# Patient Record
Sex: MALE | Race: ASIAN
[De-identification: names, ages, dates, MRNs, and addresses within clinical notes are randomized per-mention and may not be internally consistent; named-entity substitution may affect disease eponyms.]

---

## 2021-03-18 NOTE — PDOC4
Operative Note


Operative Note


Date: 318 of 2021 at 1235


Preoperative diagnosis: Recurrent ventral incisional hernia


Postoperative diagnosis: Same


Procedure: Open ventral hernia repair with mesh, removal of old mesh, component 

separation


Surgeon: Getachew Johnson assist: Pedro


Dictation: Patient is a 40-year-old male who had undergone bariatric surgery 

with complications with a repeat laparotomy subsequently developed incisional 

hernia this was repaired with mesh returns now with a recurrence of his hernia. 

Procedure of open ventral hernia repair with mesh was explained to the patient 

as well as component separation all risk benefits were also discussed occluding 

bleeding infection alternatives to this procedure also discussed with the 

patient who seemed to understand and gave both verbal and written consent to 

have the procedure performed.  Patient was taken to the operating room placed in

the supine position general anesthesia was initiated once patient was sleeping 

in bed his abdomen was prepped and draped usual sterile fashion using 

ChloraPrep.  Ioban was then placed over the abdomen a midline incision was made 

excising his previous scar from the xiphoid to just below the umbilicus.  This 

was carried down through the subcutaneous tissues electrocautery right 

hemostasis hernia sac was dissected free of its attachments with electrocautery 

and then opened to allow access to the abdomen there were few adhesions within 

the abdomen there is a band of omentum stuck down to the pelvis this was freed 

up sharply and excised and sent for pathology.  Incision was made in the 

posterior fascia freeing this up from the attachments to the muscle out 

laterally circumferentially around the fascial defect it was noted on the right 

side of the fascial defect there was a piece of mesh somewhat wadded up on the 

right upper quadrant this was sharply excised with electrocautery and removed.  

Once posterior fascia was freed up the relaxing incisions were made in the 

oblique muscles just medial to the perforating vessels this allowed for much 

mobilization of the posterior fascia.  Posterior fascia was then closed with a 

running looped PDS in the midline.  Bard Ventio mesh 22 x 27 cm was placed over 

the posterior fascia left for some uncovered area in the epigastric so a ventral

light ST mesh was placed in the epigastric area in the same plane as the 

Ventrio.  The mesh was sutured to the anterior fascia with 0 Prolene's 6 sutures

were used.  Secure strap tacker was then used to tack the mesh to the anterior 

fascia circumferentially.  The epigastric mesh was sewn to theVentrio mesh with 

0 Prolene.  The anterior fascia were then closed over the mesh with a running 0 

PDS the deep subcutaneous layer was closed with running 3-0 Vicryl and the skin 

was reapproximated for subcuticular Monocryl Mastisol Steri-Strips and 4 x 4's 

Medipore tape were applied as dressing.  Patient was awakened and extubated 

operating room taken to recovery in stable condition all sponge instrument 

needle counts listed as correct estimated blood loss 150 mL











NIKKI IYER MD             Mar 18, 2021 12:41

## 2021-03-19 NOTE — PDOC
SURGICAL PROGRESS NOTE


DATE: 3/19/21 


TIME: 10:11


Subjective


tolerating clears


urinating


has not walked yet


Vital Signs





Vital Signs








  Date Time  Temp Pulse Resp B/P (MAP) Pulse Ox O2 Delivery O2 Flow Rate FiO2


 


3/19/21 07:00 97.7 101 18 132/78 (96) 94 Room Air  





 97.7       


 


3/18/21 22:08       2.0 








I&O











Intake and Output 


 


 3/19/21





 07:00


 


Intake Total 3950 ml


 


Output Total 1150 ml


 


Balance 2800 ml


 


 


 


Intake Oral 400 ml


 


IV Total 3550 ml


 


Output Urine Total 1000 ml


 


Estimated Blood Loss 150 ml


 


# Voids 2








General:  Alert, Oriented X3, Cooperative


Abdomen:  Soft, Other (ND, binder in place)


Assessment/Plan


s/p hernia repair


advance diet


ambulate


likely home tomorrow





Justicifation of Admission Dx:


Justifications for Admission:


Justification of Admission Dx:  Yes


Comments:


hernia











HARRISON LONGORIA            Mar 19, 2021 10:12

## 2021-03-19 NOTE — NUR
SW following. Discussed with RN, pt from home alone, room air, clear liquid diet, COVID-19 
negative. Per RN pt gets around fine. RN advised no SW needs at this time. Anticipate 
discharge home with self care in the next day or so. SW will continue to follow.

## 2021-03-19 NOTE — DISCH
DISCHARGE INSTRUCTIONS


Condition on Discharge


Condition on Discharge:  Stable





Activity After Discharge


Activity Instructions for Disc:  Activity as tolerated


Bathing Instructions:  Shower-keep dressing dry, No Tub Bath until see 


Lifting Instructions after Dis:  No heavy lifting (15 lba), No pulling or 

pushing


Driving Instructions after Dis:  Do not drive





Diet after Discharge


Diet after Discharge:  Regular





Wound Incision Care


Wound/Incision Care:  Change dressing, May get incision wet


Other wound/incision instructi:  chino abdominal binder





Contacting the  after DC


Call your doctor for:  Concerns you may have





Follow-Up


Follow up with:  Dr Chilel 2 weeks call to schedule [phone]











HARRISON LONGORIA            Mar 19, 2021 10:16

## 2021-03-20 VITALS — SYSTOLIC BLOOD PRESSURE: 154 MMHG | DIASTOLIC BLOOD PRESSURE: 94 MMHG

## 2021-03-20 NOTE — PDOC
PROGRESS NOTES


Date of Service


DATE: 3/20/21 


TIME: 11:59





Subjective


Subjective


doing well





Objective


Objective





Vital Signs








  Date Time  Temp Pulse Resp B/P (MAP) Pulse Ox O2 Delivery O2 Flow Rate FiO2


 


3/20/21 09:19    155/97    


 


3/20/21 07:00 97.9 98 18  94 Room Air  





 97.9       


 


3/18/21 22:08       2.0 














Intake and Output 


 


 3/20/21





 07:00


 


Intake Total 970 ml


 


Output Total 1350 ml


 


Balance -380 ml


 


 


 


Intake Oral 970 ml


 


Output Urine Total 1350 ml


 


# Voids 3











Assessment


Assessment


S/P VHR





Plan


Plan of Care


Discharge





Comment


Review of Relevant


I have reviewed the following items jose (where applicable) has been applied.


Medications





Current Medications


Fentanyl Citrate (Fentanyl 2ml Vial) 25 mcg PRN Q5MIN  PRN IVP MILD PAIN 1-3;  

Start 3/18/21 at 06:00;  Stop 3/18/21 at 17:04;  Status DC


Fentanyl Citrate (Fentanyl 2ml Vial) 50 mcg PRN Q5MIN  PRN IVP MODERATE PAIN 4-6

Last administered on 3/18/21at 13:33;  Start 3/18/21 at 06:00;  Stop 3/18/21 at 

17:04;  Status DC


Morphine Sulfate (Morphine Sulfate) 1 mg PRN Q10MIN  PRN IVP SEVERE PAIN 7-10;  

Start 3/18/21 at 06:00;  Stop 3/18/21 at 17:04;  Status DC


Ringer's Solution 1,000 ml @  30 mls/hr Q24H IV  Last administered on 3/18/21at 

08:19;  Start 3/18/21 at 06:00;  Stop 3/18/21 at 17:59;  Status DC


Hydromorphone HCl (Dilaudid) 0.5 mg PRN Q10MIN  PRN IVP SEVERE PAIN 7-10, 2nd 

CHOICE Last administered on 3/18/21at 14:28;  Start 3/18/21 at 06:00;  Stop 

3/18/21 at 17:04;  Status DC


Prochlorperazine Edisylate (Compazine) 5 mg PACU PRN  PRN IVP NAUSEA, MRX1 Last 

administered on 3/18/21at 13:53;  Start 3/18/21 at 06:00;  Stop 3/18/21 at 17:04

;  Status DC


Acetaminophen (Tylenol) 1,000 mg 1X PREOP  PRN PO PRIOR TO PROCEDURE Last 

administered on 3/18/21at 08:19;  Start 3/18/21 at 06:00


Cefazolin Sodium/ Dextrose 50 ml @  100 mls/hr 1X PREOP  PRN IV PRIOR TO 

PROCEDURE Last administered on 3/18/21at 09:58;  Start 3/18/21 at 06:00;  Stop 

3/18/21 at 18:00;  Status DC


Bupivacaine HCl/ Epinephrine Bitart (Sensorcain-Epi 0.25% Kit) 30 ml STK-MED 

ONCE .ROUTE  Last administered on 3/18/21at 10:18;  Start 3/18/21 at 07:19;  

Stop 3/18/21 at 07:19;  Status DC


Propofol (Diprivan) 200 mg STK-MED ONCE IV ;  Start 3/18/21 at 09:39;  Stop 

3/18/21 at 09:39;  Status DC


Lidocaine HCl (Lidocaine Pf 2% Vial) 5 ml STK-MED ONCE .ROUTE ;  Start 3/18/21 

at 09:39;  Stop 3/18/21 at 09:39;  Status DC


Ondansetron HCl (Zofran) 4 mg STK-MED ONCE .ROUTE ;  Start 3/18/21 at 09:39;  

Stop 3/18/21 at 09:39;  Status DC


Dexamethasone Sodium Phosphate (Decadron) 4 mg STK-MED ONCE .ROUTE ;  Start 

3/18/21 at 09:39;  Stop 3/18/21 at 09:39;  Status DC


Succinylcholine Chloride (Anectine) 200 mg STK-MED ONCE .ROUTE ;  Start 3/18/21 

at 09:39;  Stop 3/18/21 at 09:40;  Status DC


Rocuronium Bromide (Zemuron) 50 mg STK-MED ONCE .ROUTE ;  Start 3/18/21 at 

09:39;  Stop 3/18/21 at 09:40;  Status DC


Fentanyl Citrate (Fentanyl 2ml Vial) 100 mcg STK-MED ONCE .ROUTE ;  Start 

3/18/21 at 09:39;  Stop 3/18/21 at 09:40;  Status DC


Midazolam HCl (Versed) 2 mg STK-MED ONCE .ROUTE ;  Start 3/18/21 at 09:40;  Stop

3/18/21 at 09:40;  Status DC


Famotidine (Pepcid Vial) 20 mg STK-MED ONCE .ROUTE ;  Start 3/18/21 at 09:40;  

Stop 3/18/21 at 09:40;  Status DC


Glycopyrrolate (Robinul) 1 mg STK-MED ONCE .ROUTE ;  Start 3/18/21 at 10:33;  

Stop 3/18/21 at 10:33;  Status DC


Rocuronium Bromide (Zemuron) 50 mg STK-MED ONCE .ROUTE ;  Start 3/18/21 at 

10:58;  Stop 3/18/21 at 10:59;  Status DC


Phenylephrine HCl (PHENYLEPHRINE in 0.9% NACL PF) 1 mg STK-MED ONCE IV ;  Start 

3/18/21 at 11:22;  Stop 3/18/21 at 11:22;  Status DC


Ephedrine Sulfate (ePHEDrine PF IN SALINE SYRINGE) 50 mg STK-MED ONCE IV ;  

Start 3/18/21 at 11:22;  Stop 3/18/21 at 11:22;  Status DC


Neostigmine Bromide (Neostigmine Methylsulfate) 5 mg STK-MED ONCE .ROUTE ;  

Start 3/18/21 at 11:31;  Stop 3/18/21 at 11:31;  Status DC


Rocuronium Bromide (Zemuron) 50 mg STK-MED ONCE .ROUTE ;  Start 3/18/21 at 

11:45;  Stop 3/18/21 at 11:46;  Status DC


Morphine Sulfate (Morphine Sulfate) 10 mg STK-MED ONCE .ROUTE ;  Start 3/18/21 

at 11:58;  Stop 3/18/21 at 11:58;  Status DC


Ketorolac Tromethamine (Toradol 30mg Vial) 30 mg STK-MED ONCE .ROUTE ;  Start 

3/18/21 at 12:30;  Stop 3/18/21 at 12:30;  Status DC


Cefoxitin Sodium (Mefoxin) 1 gm Q8H IVP  Last administered on 3/19/21at 05:37;  

Start 3/18/21 at 14:00;  Stop 3/19/21 at 06:01;  Status DC


Sodium Chloride (Normal Saline Flush) 3 ml QSHIFT  PRN IV AFTER MEDS AND BLOOD 

DRAWS;  Start 3/18/21 at 12:45


Morphine Sulfate (Morphine Sulfate) 2 mg PRN Q3HRS  PRN IV PAIN Last 

administered on 3/19/21at 22:11;  Start 3/18/21 at 12:45


Oxycodone/ Acetaminophen (Percocet 5/325) 1 tab PRN Q4HRS  PRN PO MILD PAIN, 1ST

CHOICE;  Start 3/18/21 at 12:45


Oxycodone/ Acetaminophen (Percocet 5/325) 2 tab PRN Q4HRS  PRN PO MODERATE PAIN,

SEVERE PAIN Last administered on 3/19/21at 21:03;  Start 3/18/21 at 12:45


Ketorolac Tromethamine (Toradol 15mg Vial) 15 mg Q6HRS IV  Last administered on 

3/20/21at 05:33;  Start 3/18/21 at 18:00;  Stop 3/20/21 at 17:59


Ondansetron HCl (Zofran) 4 mg PRN Q6HRS  PRN IV NAUSEA, 1ST CHOICE;  Start 

3/18/21 at 12:45


Dextrose/Lactated Ringer's 1,000 ml @  75 mls/hr [id number] IV  Last administered 

on 3/20/21at 05:34;  Start 3/18/21 at 14:00


Sevoflurane (Ultane) 90 ml STK-MED ONCE IH ;  Start 3/18/21 at 13:06;  Stop 

3/18/21 at 13:07;  Status DC


Fentanyl Citrate (Fentanyl 2ml Vial) 100 mcg STK-MED ONCE .ROUTE ;  Start 

3/18/21 at 13:24;  Stop 3/18/21 at 13:25;  Status DC


Hydromorphone HCl (Dilaudid) 2 mg STK-MED ONCE .ROUTE ;  Start 3/18/21 at 13:48;

 Stop 3/18/21 at 13:48;  Status DC


Docusate Sodium (Colace) 100 mg DAILY PO  Last administered on 3/20/21at 09:19; 

Start 3/20/21 at 09:00


Tamsulosin HCl (Flomax) 0.4 mg QHS PO  Last administered on 3/19/21at 21:03;  

Start 3/19/21 at 21:00


Bupropion HCl (Wellbutrin Xl) 300 mg DAILY PO  Last administered on 3/20/21at 

09:18;  Start 3/20/21 at 09:00


Pantoprazole Sodium (Protonix) 40 mg DAILYAC PO  Last administered on 3/20/21at 

09:19;  Start 3/20/21 at 07:30


Losartan Potassium (Cozaar) 100 mg DAILY PO  Last administered on 3/20/21at 

09:19;  Start 3/20/21 at 09:00


Hydrochlorothiazide (Microzide) 12.5 mg DAILY PO  Last administered on 3/20/21at

09:19;  Start 3/20/21 at 09:00





Active Scripts


Active


Percocet 5-325 Mg Tablet ** (Oxycodone/Acetaminophen) 1 Each Tablet 1 Tab PO PRN

Q4HRS PRN


Reported


Diovan Hct 160-12.5 Mg Tab (Valsartan/Hydrochlorothiazide) 1 Each Tablet 1 Each 

PO DAILY


Multi Vitamin Daily (Multivitamin) 1 Each Tablet 1 Tab PO DAILY 30 Days


Zofran (Ondansetron Hcl) 4 Mg Tablet 4 Mg PO BID PRN


Bupropion Xl (Bupropion Hcl) 300 Mg Tab.er.24h 1 Tab PO DAILYWBKFT


Cyclobenzaprine Hcl 10 Mg Tablet 10 Mg PO PRN 3-4XD PRN


Omeprazole 20 Mg Tablet.dr 20 Mg PO DAILY


Flomax (Tamsulosin Hcl) 0.4 Mg Cap.er.24h 0.4 Mg PO DAILY


Vitals/I & O





Vital Sign - Last 24 Hours








 3/19/21 3/19/21 3/19/21 3/19/21





 15:00 19:00 20:00 21:03


 


Temp 97.7 97.6  





 97.7 97.6  


 


Pulse 92 103  


 


Resp 18 18  20


 


B/P (MAP) 142/89 (106) 158/98 (118)  


 


Pulse Ox 95 95  95


 


O2 Delivery Room Air Room Air Room Air Room Air


 


    





    





 3/19/21 3/19/21 3/19/21 3/19/21





 22:03 22:11 22:41 23:00


 


Temp    97.4





    97.4


 


Pulse    110


 


Resp 20 20 20 18


 


B/P (MAP)    148/94 (112)


 


Pulse Ox 95 95 95 93


 


O2 Delivery Room Air Room Air Room Air Room Air


 


    





    





 3/20/21 3/20/21 3/20/21 





 03:00 07:00 09:19 


 


Temp 97.9 97.9  





 97.9 97.9  


 


Pulse 102 98  


 


Resp 18 18  


 


B/P (MAP) 148/90 (109) 155/97 (116) 155/97 


 


Pulse Ox 94 94  


 


O2 Delivery Room Air Room Air  














Intake and Output   


 


 3/19/21 3/19/21 3/20/21





 15:00 23:00 07:00


 


Intake Total 850 ml 120 ml 


 


Output Total  750 ml 600 ml


 


Balance 850 ml -630 ml -600 ml











Justifications for Admission


Other Justification














RIKY KELSEY MD             Mar 20, 2021 11:59

## 2021-03-22 NOTE — PATHOLOGY
[facility] Accession Number: [id number]

.                                                                01

Material submitted:                                        .

PART A: gastrointestinal site - OMENTUM

PART B: gastrointestinal site - MESH

.                                                                01

Clinical history:                                          .

OPEN INCISIONAL HERNIA REPAIR

RECURRING INCISIONAL HERNIA

.                                                                02

**********************************************************************

Diagnosis:

A.  Omentum, resection:

- Fat necrosis, multifocal, organizing, with reactive fibrosis, chronic

inflammation, and foreign body granulomatous reaction.

.

B.  Segment of fibromembranous and fibroadipose tissue, recurrent

incisonal hernia repair:

- Hernia sac with synthetic mesh showing reactive fibrosis, chronic

inflammation and foreign body granulomatous reaction.

(JPM:pratik; 03/22/2021)

S  03/22/2021  1251 Local

**********************************************************************

.                                                                02

Comment:

There is no evidence of malignancy.

(JPM:pratik; 03/22/2021)

.                                                                02

Electronically signed:                                     .

Brian Calloway MD, Pathologist

NPI- [id number]

.                                                                01

Gross description:                                         .

A.  Received in formalin labeled "Banerjee, Angel, omentum" is an

irregular, tan yellow lobular portions of fibroadipose tissue measuring

18.1 x 8.3 x 3.1 cm.  The specimen is serially sectioned to reveal

lobular, tan-yellow highly vascularized cut surface with 2 firm tan-white

to chalky yellow nodules grossly consistent with fat necrosis measuring

2.1 x 0.9 x 0.8 cm and 2.4 x 1.6 x 1.2 cm.  Representative sections from

each nodule and omentum are submitted in cassettes A1-A3.

.

B.  Received in formalin labeled "Banerjee, Angel, mesh" is an irregular,

tan yellow tan-white fibrous portion of soft tissue measuring 11.4 x 7.1 x

1.9 cm.  Specimen is serially sectioned to reveal a tan yellow tan-white

cut surface with a tan-white meshlike material representative sections of

the specimen are submitted in cassette B1.([facility]; 3/19/2021)

GZA/GZA  03/22/2021  [address] [facility]

.                                                                02

Pathologist provided ICD-10:

K65.9, K43.2

.                                                                02

CPT                                                        .

360512, 113272

Specimen Comment: A courtesy copy of this report has been sent to [phone]

Specimen Comment: Report sent to 

***Performed at:  01

   [facility]

   [address]  [id number]

   MD Samuel Garcias MD Phone:  [phone]

***Performed at:  02

   [facility]

   [address] [place] [address]  [id number]

   MD Brian Calloway MD Phone:  [phone]

## 2021-05-26 ENCOUNTER — HOSPITAL ENCOUNTER (OUTPATIENT)
Dept: HOSPITAL 61 - CT | Age: 41
End: 2021-05-26
Attending: NURSE PRACTITIONER
Payer: COMMERCIAL

## 2021-05-26 DIAGNOSIS — T81.49XA: Primary | ICD-10-CM

## 2021-05-26 DIAGNOSIS — N28.89: ICD-10-CM

## 2021-05-26 PROCEDURE — 74177 CT ABD & PELVIS W/CONTRAST: CPT

## 2021-05-26 NOTE — RAD
EXAM: Abdomen and pelvis CT with intravenous contrast.



HISTORY: Wound infection.



TECHNIQUE: Computed tomographic images of the abdomen and pelvis were obtained following the administ
ration of intravenous contrast. Multiplanar reformatting was performed.



*One or more of the following individualized dose reduction techniques were utilized for this examina
tion:  

1. Automated exposure control.  

2. Adjustment of the mA and/or kV according to patient size.  

3. Use of iterative reconstruction technique.



COMPARISON: None.



FINDINGS: Evaluation of the lower thorax demonstrates bilateral posterior dependent atelectasis. Ther
e is trace pleural fluid. The heart is normal in size. There postoperative changes involving the stom
ach. No hepatic lesion is seen. The gallbladder is surgically absent. The pancreas, spleen and adrena
l glands are unremarkable. There is bilateral caliectasis. There is no tania hydronephrosis. There is
 no appendicitis. There is no bowel obstruction. The urinary bladder is distended. The aorta is laverne
l in caliber. There is no lymphadenopathy.



There is a fluid collection along the fascial plane between the ventral peritoneum and ventral abdomi
nal wall musculature measuring approximately 15 cm in length, 12 cm transversely and 2.5 cm in thickn
ess. There is may communicate via a small track with a fluid collection within the ventral abdominal 
wall fat measuring approximately 3.8 x 2.6 cm at the level of the umbilicus. There is overlying skin 
thickening and surrounding stranding due to relative recent surgery and suspected superimposed cellul
itis. There are degenerative changes throughout the visualized spine. There is no acute or suspicious
 osseous finding.



IMPRESSION: 

1. Loculated fluid collection between the ventral abdominal wall peritoneum and ventral abdominal wal
l muscular fascial plane measuring approximately 15 x 12 x 2.5 cm and likely communicating the small 
tract with a smaller fluid collection within the midline ventral abdominal wall subcutaneous tissues 
fat at the level of the umbilicus. Given the reported history of a wound infection, this likely due t
o an abscess rather than hematoma/seroma. No intraperitoneal collection is seen.

2. Postoperative changes involving the stomach and small bowel. There is no evidence of bowel obstruc
tion or anastomotic dehiscence.

3. Bilateral renal caliectasis. There is no hydronephrosis or suspicious renal lesion.



Electronically signed by: Valentina Mills MD (5/26/2021 10:24 AM) [id number]

## 2021-06-03 ENCOUNTER — HOSPITAL ENCOUNTER (OUTPATIENT)
Dept: HOSPITAL 61 - INTRAD | Age: 41
Discharge: HOME | End: 2021-06-03
Attending: RADIOLOGY
Payer: COMMERCIAL

## 2021-06-03 VITALS — DIASTOLIC BLOOD PRESSURE: 76 MMHG | SYSTOLIC BLOOD PRESSURE: 138 MMHG

## 2021-06-03 VITALS — SYSTOLIC BLOOD PRESSURE: 138 MMHG | DIASTOLIC BLOOD PRESSURE: 73 MMHG

## 2021-06-03 VITALS — SYSTOLIC BLOOD PRESSURE: 149 MMHG | DIASTOLIC BLOOD PRESSURE: 85 MMHG

## 2021-06-03 VITALS — DIASTOLIC BLOOD PRESSURE: 76 MMHG | SYSTOLIC BLOOD PRESSURE: 133 MMHG

## 2021-06-03 VITALS — DIASTOLIC BLOOD PRESSURE: 73 MMHG | SYSTOLIC BLOOD PRESSURE: 131 MMHG

## 2021-06-03 VITALS — DIASTOLIC BLOOD PRESSURE: 83 MMHG | SYSTOLIC BLOOD PRESSURE: 128 MMHG

## 2021-06-03 VITALS — SYSTOLIC BLOOD PRESSURE: 147 MMHG | DIASTOLIC BLOOD PRESSURE: 78 MMHG

## 2021-06-03 DIAGNOSIS — Z79.899: ICD-10-CM

## 2021-06-03 DIAGNOSIS — T88.8XXA: Primary | ICD-10-CM

## 2021-06-03 DIAGNOSIS — Z98.890: ICD-10-CM
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
## 2021-06-03 DIAGNOSIS — F32.9: ICD-10-CM

## 2021-06-03 DIAGNOSIS — F41.9: ICD-10-CM

## 2021-06-03 DIAGNOSIS — N40.0: ICD-10-CM

## 2021-06-03 DIAGNOSIS — T81.49XA: ICD-10-CM
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
## 2021-06-03 DIAGNOSIS — I10: ICD-10-CM
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
## 2021-06-03 DIAGNOSIS — Z87.891: ICD-10-CM
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
## 2021-06-03 DIAGNOSIS — Z20.822: ICD-10-CM

## 2021-06-03 DIAGNOSIS — K21.9: ICD-10-CM

## 2021-06-03 LAB
ANION GAP SERPL CALC-SCNC: 9 MMOL/L (ref 6–14)
BASOPHILS # BLD AUTO: 0.1 X10^3/UL (ref 0–0.2)
BASOPHILS NFR BLD: 1 % (ref 0–3)
BUN SERPL-MCNC: 15 MG/DL (ref 8–26)
CALCIUM SERPL-MCNC: 8.8 MG/DL (ref 8.5–10.1)
CHLORIDE SERPL-SCNC: 105 MMOL/L (ref 98–107)
CO2 SERPL-SCNC: 28 MMOL/L (ref 21–32)
CREAT SERPL-MCNC: 0.8 MG/DL (ref 0.7–1.3)
EOSINOPHIL NFR BLD: 0.1 X10^3/UL (ref 0–0.7)
EOSINOPHIL NFR BLD: 2 % (ref 0–3)
ERYTHROCYTE [DISTWIDTH] IN BLOOD BY AUTOMATED COUNT: 13.9 % (ref 11.5–14.5)
GFR SERPLBLD BASED ON 1.73 SQ M-ARVRAT: 107.1 ML/MIN
GLUCOSE SERPL-MCNC: 93 MG/DL (ref 70–99)
HCT VFR BLD CALC: 41.6 % (ref 39–53)
HGB BLD-MCNC: 13.8 G/DL (ref 13–17.5)
LYMPHOCYTES # BLD: 1.5 X10^3/UL (ref 1–4.8)
LYMPHOCYTES NFR BLD AUTO: 20 % (ref 24–48)
MCH RBC QN AUTO: 30 PG (ref 25–35)
MCHC RBC AUTO-ENTMCNC: 33 G/DL (ref 31–37)
MCV RBC AUTO: 90 FL (ref 79–100)
MONO #: 0.8 X10^3/UL (ref 0–1.1)
MONOCYTES NFR BLD: 11 % (ref 0–9)
NEUT #: 5 X10^3/UL (ref 1.8–7.7)
NEUTROPHILS NFR BLD AUTO: 67 % (ref 31–73)
PLATELET # BLD AUTO: 199 X10^3/UL (ref 140–400)
POTASSIUM SERPL-SCNC: 3.6 MMOL/L (ref 3.5–5.1)
PROTHROMBIN TIME: 11.8 SEC (ref 11.7–14)
RBC # BLD AUTO: 4.65 X10^6/UL (ref 4.3–5.7)
SODIUM SERPL-SCNC: 142 MMOL/L (ref 136–145)
WBC # BLD AUTO: 7.4 X10^3/UL (ref 4–11)

## 2021-06-03 PROCEDURE — 80048 BASIC METABOLIC PNL TOTAL CA: CPT

## 2021-06-03 PROCEDURE — 87426 SARSCOV CORONAVIRUS AG IA: CPT

## 2021-06-03 PROCEDURE — 10030 IMG GID FLU COLL DRG SFT TIS: CPT

## 2021-06-03 PROCEDURE — 85025 COMPLETE CBC W/AUTO DIFF WBC: CPT

## 2021-06-03 PROCEDURE — 36415 COLL VENOUS BLD VENIPUNCTURE: CPT

## 2021-06-03 PROCEDURE — 85610 PROTHROMBIN TIME: CPT

## 2021-06-03 PROCEDURE — 87071 CULTURE AEROBIC QUANT OTHER: CPT

## 2021-06-03 PROCEDURE — 99152 MOD SED SAME PHYS/QHP 5/>YRS: CPT

## 2021-06-03 PROCEDURE — 87075 CULTR BACTERIA EXCEPT BLOOD: CPT

## 2021-06-03 PROCEDURE — C1892 INTRO/SHEATH,FIXED,PEEL-AWAY: HCPCS

## 2021-06-03 NOTE — NUR
Patient's PIV removed, instructions provided on site care. Informed patient to followup w/ 
Dr. Chilel regarding drainage. 

Patient and family verbalized understanding. VS stable. 

All belongings taken w/ patient at time of d/c.

## 2021-06-03 NOTE — PDOC
Exam








Alvin





Pre-Procedure Diagnosis


Pre-Procedure Diagnosis


Seroma, abdominal wall post op





Post-Procedure Diagnosis


Post-Procedure Diagnosis


same, but with interval spontaneous decompression via wound in upper incision.





Procedure Performed


Procedure Performed


US guided aspiration yields only 5 cc of fluid. Sent for culture. Called Dr Chilel and explained findings. Patient to followup up in clinic for wound care 

ASAP .





Type of Anesthesia


Type of Anesthesia


mod sed





Estimated Blood Loss


EBL:


0





Specimens


Specimans


abdominal collection aspirate





Drain/Tubes


Drains/Tubes


none





Disposition


Disposition


CVOBS for recover


F/U with Dr Chilel, as discussed.











JOSSY ROBINS MD                Lonny 3, 2021 11:06

## 2021-06-04 NOTE — RAD
Ultrasound-guided aspiration, anterior abdominal wall fluid collection

6/3/2021

 

Clinical Indication:   Abdominal Fluid Collection, postop abdominal hernia

repair with component separation and mesh



Discussion:

 

 The procedure was explained in its entirety to the patient or the patients

designated representative by a member of the treatment team, including a

discussion of the risks, benefits and commonly accepted alternatives to the

procedure, as well as the expected consequences of no therapy whatsoever.  

Discussion of the risks included, but was not limited to, those that are most

frequent and those that are rare but possibly severe or life-threatening, as

well as the possibility of unforeseen complications.



  All elements of maximal sterile barrier technique including the use of a

cap, mask, sterile gown, sterile gloves, large sterile sheet, appropriate hand

hygiene, and 2% chlorhexidine for cutaneous antisepsis (or acceptable

alternative antiseptic per current guidelines) were followed for this

procedure. 



On physical exam there is now an open wound, approximately 1 to 2 cm in size

along the superior aspect of the incision. Serous fluid is seen freely leaking

from this wound. Ultrasound evaluation demonstrated significantly reduced

amount of subcutaneous fluid within the anterior abdominal wall with respect

to CT imaging. Decision to aspirate some of this fluid for Gram stain and

culture was made. The overlying skin was prepped and draped using sterile

barrier technique. 1% lidocaine was administered for local anesthesia. A 5

Dominican sheath needle was advanced into the fluid collection. 5 cc of

serosanguineous fluid was removed. Sheath was removed. Manual pressure was

held. No immediate complications were identified.  



The procedures performed under conscious sedation including continuous

cardiopulmonary monitoring via dedicated sedation nurse. 

Face-to-face sedation time: 20 minutes



Impression: Ultrasound-guided aspiration anterior abdominal wall fluid

collection